# Patient Record
Sex: MALE | Race: WHITE | HISPANIC OR LATINO | Employment: FULL TIME | URBAN - METROPOLITAN AREA
[De-identification: names, ages, dates, MRNs, and addresses within clinical notes are randomized per-mention and may not be internally consistent; named-entity substitution may affect disease eponyms.]

---

## 2022-09-25 ENCOUNTER — HOSPITAL ENCOUNTER (EMERGENCY)
Facility: CLINIC | Age: 42
Discharge: HOME | End: 2022-09-26
Attending: EMERGENCY MEDICINE | Admitting: EMERGENCY MEDICINE
Payer: COMMERCIAL

## 2022-09-25 DIAGNOSIS — R00.2 PALPITATIONS: ICD-10-CM

## 2022-09-25 DIAGNOSIS — E87.6 HYPOKALEMIA: ICD-10-CM

## 2022-09-25 LAB
BASOPHILS # BLD AUTO: 0.2 10E3/UL (ref 0–0.2)
BASOPHILS NFR BLD AUTO: 1 %
EOSINOPHIL # BLD AUTO: 0.4 10E3/UL (ref 0–0.7)
EOSINOPHIL NFR BLD AUTO: 3 %
ERYTHROCYTE [DISTWIDTH] IN BLOOD BY AUTOMATED COUNT: 12 % (ref 10–15)
HCT VFR BLD AUTO: 45.2 % (ref 40–53)
HGB BLD-MCNC: 16.2 G/DL (ref 13.3–17.7)
IMM GRANULOCYTES # BLD: 0.1 10E3/UL
IMM GRANULOCYTES NFR BLD: 0 %
LACTATE SERPL-SCNC: 1.8 MMOL/L (ref 0.7–2)
LYMPHOCYTES # BLD AUTO: 3.1 10E3/UL (ref 0.8–5.3)
LYMPHOCYTES NFR BLD AUTO: 19 %
MCH RBC QN AUTO: 30.3 PG (ref 26.5–33)
MCHC RBC AUTO-ENTMCNC: 35.8 G/DL (ref 31.5–36.5)
MCV RBC AUTO: 85 FL (ref 78–100)
MONOCYTES # BLD AUTO: 0.9 10E3/UL (ref 0–1.3)
MONOCYTES NFR BLD AUTO: 6 %
NEUTROPHILS # BLD AUTO: 11.7 10E3/UL (ref 1.6–8.3)
NEUTROPHILS NFR BLD AUTO: 71 %
NRBC # BLD AUTO: 0 10E3/UL
NRBC BLD AUTO-RTO: 0 /100
PLATELET # BLD AUTO: 296 10E3/UL (ref 150–450)
RBC # BLD AUTO: 5.35 10E6/UL (ref 4.4–5.9)
WBC # BLD AUTO: 16.4 10E3/UL (ref 4–11)

## 2022-09-25 PROCEDURE — 99284 EMERGENCY DEPT VISIT MOD MDM: CPT | Performed by: EMERGENCY MEDICINE

## 2022-09-25 PROCEDURE — 84443 ASSAY THYROID STIM HORMONE: CPT | Performed by: EMERGENCY MEDICINE

## 2022-09-25 PROCEDURE — 85025 COMPLETE CBC W/AUTO DIFF WBC: CPT | Performed by: EMERGENCY MEDICINE

## 2022-09-25 PROCEDURE — 84484 ASSAY OF TROPONIN QUANT: CPT | Performed by: EMERGENCY MEDICINE

## 2022-09-25 PROCEDURE — 83605 ASSAY OF LACTIC ACID: CPT | Performed by: EMERGENCY MEDICINE

## 2022-09-25 PROCEDURE — 80053 COMPREHEN METABOLIC PANEL: CPT | Performed by: EMERGENCY MEDICINE

## 2022-09-25 PROCEDURE — 36415 COLL VENOUS BLD VENIPUNCTURE: CPT | Performed by: EMERGENCY MEDICINE

## 2022-09-25 PROCEDURE — 83735 ASSAY OF MAGNESIUM: CPT | Performed by: EMERGENCY MEDICINE

## 2022-09-25 PROCEDURE — 99284 EMERGENCY DEPT VISIT MOD MDM: CPT

## 2022-09-25 RX ORDER — GABAPENTIN 100 MG/1
100 CAPSULE ORAL 2 TIMES DAILY
COMMUNITY

## 2022-09-25 RX ORDER — FUROSEMIDE 40 MG
40 TABLET ORAL DAILY
COMMUNITY

## 2022-09-25 RX ORDER — SPIRONOLACTONE 100 MG/1
100 TABLET, FILM COATED ORAL 2 TIMES DAILY
COMMUNITY

## 2022-09-25 RX ORDER — HYDROXYZINE HYDROCHLORIDE 25 MG/1
25 TABLET, FILM COATED ORAL 4 TIMES DAILY PRN
COMMUNITY

## 2022-09-25 ASSESSMENT — ACTIVITIES OF DAILY LIVING (ADL): ADLS_ACUITY_SCORE: 33

## 2022-09-26 VITALS
RESPIRATION RATE: 16 BRPM | WEIGHT: 215 LBS | SYSTOLIC BLOOD PRESSURE: 129 MMHG | TEMPERATURE: 98 F | OXYGEN SATURATION: 95 % | DIASTOLIC BLOOD PRESSURE: 85 MMHG | HEART RATE: 95 BPM

## 2022-09-26 LAB
ALBUMIN SERPL-MCNC: 3.9 G/DL (ref 3.4–5)
ALP SERPL-CCNC: 106 U/L (ref 40–150)
ALT SERPL W P-5'-P-CCNC: 31 U/L (ref 0–70)
ANION GAP SERPL CALCULATED.3IONS-SCNC: 7 MMOL/L (ref 3–14)
AST SERPL W P-5'-P-CCNC: 23 U/L (ref 0–45)
ATRIAL RATE - MUSE: 100 BPM
BILIRUB SERPL-MCNC: 0.3 MG/DL (ref 0.2–1.3)
BUN SERPL-MCNC: 15 MG/DL (ref 7–30)
CALCIUM SERPL-MCNC: 9.1 MG/DL (ref 8.5–10.1)
CHLORIDE BLD-SCNC: 100 MMOL/L (ref 94–109)
CO2 SERPL-SCNC: 29 MMOL/L (ref 20–32)
CREAT SERPL-MCNC: 1.21 MG/DL (ref 0.66–1.25)
DIASTOLIC BLOOD PRESSURE - MUSE: NORMAL MMHG
GFR SERPL CREATININE-BSD FRML MDRD: 78 ML/MIN/1.73M2
GLUCOSE BLD-MCNC: 115 MG/DL (ref 70–99)
HOLD SPECIMEN: NORMAL
HOLD SPECIMEN: NORMAL
INTERPRETATION ECG - MUSE: NORMAL
MAGNESIUM SERPL-MCNC: 1.9 MG/DL (ref 1.6–2.3)
MAGNESIUM SERPL-MCNC: 2 MG/DL (ref 1.6–2.3)
P AXIS - MUSE: 60 DEGREES
POTASSIUM BLD-SCNC: 3 MMOL/L (ref 3.4–5.3)
PR INTERVAL - MUSE: 142 MS
PROT SERPL-MCNC: 8.5 G/DL (ref 6.8–8.8)
QRS DURATION - MUSE: 102 MS
QT - MUSE: 344 MS
QTC - MUSE: 443 MS
R AXIS - MUSE: 80 DEGREES
SODIUM SERPL-SCNC: 136 MMOL/L (ref 133–144)
SYSTOLIC BLOOD PRESSURE - MUSE: NORMAL MMHG
T AXIS - MUSE: 48 DEGREES
TROPONIN I SERPL HS-MCNC: 4 NG/L
TROPONIN T BLD-MCNC: 0 UG/L
TSH SERPL DL<=0.005 MIU/L-ACNC: 1.52 MU/L (ref 0.4–4)
VENTRICULAR RATE- MUSE: 100 BPM

## 2022-09-26 PROCEDURE — 250N000013 HC RX MED GY IP 250 OP 250 PS 637: Performed by: EMERGENCY MEDICINE

## 2022-09-26 PROCEDURE — 84484 ASSAY OF TROPONIN QUANT: CPT

## 2022-09-26 RX ORDER — POTASSIUM CHLORIDE 1.5 G/1.58G
40 POWDER, FOR SOLUTION ORAL ONCE
Status: COMPLETED | OUTPATIENT
Start: 2022-09-26 | End: 2022-09-26

## 2022-09-26 RX ADMIN — POTASSIUM CHLORIDE 40 MEQ: 1.5 FOR SOLUTION ORAL at 00:41

## 2022-09-26 ASSESSMENT — ENCOUNTER SYMPTOMS
PALPITATIONS: 1
ABDOMINAL DISTENTION: 0
LIGHT-HEADEDNESS: 0
DIZZINESS: 0

## 2022-09-26 ASSESSMENT — ACTIVITIES OF DAILY LIVING (ADL): ADLS_ACUITY_SCORE: 35

## 2022-09-26 NOTE — RESULT ENCOUNTER NOTE
Per protocol meds refilled.  Last OV 11/30/18.  Scripts escribed.   Within normal reference range.

## 2022-09-26 NOTE — DISCHARGE INSTRUCTIONS
Please make an appointment to follow up with Your Primary Care Provider as soon as possible. Please ask them to recheck your potassium level, and also review your need for continuing the Lasix.

## 2022-09-26 NOTE — ED TRIAGE NOTES
Triage Assessment     Row Name 09/25/22 [id number]       Triage Assessment (Adult)    Airway WDL WDL       Respiratory WDL    Respiratory WDL WDL       Skin Circulation/Temperature WDL    Skin Circulation/Temperature WDL WDL       Cardiac WDL    Cardiac WDL X;all    Cardiac Rhythm other (see comments)  reports irregular heart beat

## 2022-09-26 NOTE — ED PROVIDER NOTES
"     [facility] EMERGENCY DEPARTMENT ([facility])  9/25/22    History     Chief Complaint   Patient presents with     Palpitations     Reports for the past 1.5 weeks has had palpitations, reports has had them before and has had work up, feels them when sitting, feels \"them flip-flop\", mild pain to upper back between shoulder blades     HPI  Robert Iglesias is a 42 year old male with PMH significant for alcoholic cirrhosis of the liver with ascites and alcohol dependence now in remission (last drink 4 years ago) who presents to the ED for evaluation of palpitations for the past week.  He states that he gets a sensation of his heart skipping/adding a beat intermittently throughout the day.  He states that he only notices these palpitations when he is at rest and denies noting any while he is exerting himself.  He denies chest pain, lightheadedness, or dizziness.    Patient reports he did have this previously approximately 7 years ago that did resolve on its own.  He was worked up at a outside hospital in [place] without specific diagnosis.  Patient does have a history of ascites and is currently on spironolactone and Lasix.  He reports he has not had a drink in 4 years and has not had any fluid buildup.  Patient reports that he does smoke and he has been doing so since his early 20s.  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    Past Medical History  No past medical history on file.  No past surgical history on file.  furosemide (LASIX) 40 MG tablet  gabapentin (NEURONTIN) 100 MG capsule  hydrOXYzine (ATARAX) 25 MG tablet  spironolactone (ALDACTONE) 100 MG tablet      No Known Allergies  Family History  No family history on file.  Social History       Past medical history, past surgical history, medications, allergies, family history, and social history were reviewed with the patient. No additional pertinent items.       Review of Systems   Cardiovascular: Positive for palpitations. Negative for chest pain " and leg swelling.   Gastrointestinal: Negative for abdominal distention.   Neurological: Negative for dizziness and light-headedness.   All other systems reviewed and are negative.    A complete review of systems was performed with pertinent positives and negatives noted in the HPI, and all other systems negative.    Physical Exam   BP: (!) 144/90  Pulse: 115  Temp: 98.6  F (37  C)  Resp: 16  Weight: 97.5 kg (215 lb)  SpO2: 99 %  Physical Exam  Vitals and nursing note reviewed.   Constitutional:       General: He is not in acute distress.     Appearance: He is not diaphoretic.   HENT:      Head: Atraumatic.   Eyes:      General: No scleral icterus.     Pupils: Pupils are equal, round, and reactive to light.   Cardiovascular:      Rate and Rhythm: Normal rate and regular rhythm.      Heart sounds: Normal heart sounds.   Pulmonary:      Effort: No respiratory distress.      Breath sounds: Normal breath sounds.   Abdominal:      General: Bowel sounds are normal.      Palpations: Abdomen is soft.      Tenderness: There is no abdominal tenderness.   Musculoskeletal:         General: No tenderness.   Skin:     General: Skin is warm.      Findings: No rash.   Neurological:      General: No focal deficit present.      Mental Status: He is oriented to person, place, and time.           ED Course      Procedures   12:07 AM  The patient was seen and examined by Alexys Barcenas MD in Room ED18.                   Results for orders placed or performed during the hospital encounter of 09/25/22   Comprehensive metabolic panel     Status: Abnormal   Result Value Ref Range    Sodium 136 133 - 144 mmol/L    Potassium 3.0 (L) 3.4 - 5.3 mmol/L    Chloride 100 94 - 109 mmol/L    Carbon Dioxide (CO2) 29 20 - 32 mmol/L    Anion Gap 7 3 - 14 mmol/L    Urea Nitrogen 15 7 - 30 mg/dL    Creatinine 1.21 0.66 - 1.25 mg/dL    Calcium 9.1 8.5 - 10.1 mg/dL    Glucose 115 (H) 70 - 99 mg/dL    Alkaline Phosphatase 106 40 - 150 U/L    AST 23 0 - 45  U/L    ALT 31 0 - 70 U/L    Protein Total 8.5 6.8 - 8.8 g/dL    Albumin 3.9 3.4 - 5.0 g/dL    Bilirubin Total 0.3 0.2 - 1.3 mg/dL    GFR Estimate 78 >60 mL/min/1.73m2   Magnesium     Status: Normal   Result Value Ref Range    Magnesium 2.0 1.6 - 2.3 mg/dL   TSH with free T4 reflex     Status: Normal   Result Value Ref Range    TSH 1.52 0.40 - 4.00 mU/L   CBC with platelets and differential     Status: Abnormal   Result Value Ref Range    WBC Count 16.4 (H) 4.0 - 11.0 10e3/uL    RBC Count 5.35 4.40 - 5.90 10e6/uL    Hemoglobin 16.2 13.3 - 17.7 g/dL    Hematocrit 45.2 40.0 - 53.0 %    MCV 85 78 - 100 fL    MCH 30.3 26.5 - 33.0 pg    MCHC 35.8 31.5 - 36.5 g/dL    RDW 12.0 10.0 - 15.0 %    Platelet Count 296 150 - 450 10e3/uL    % Neutrophils 71 %    % Lymphocytes 19 %    % Monocytes 6 %    % Eosinophils 3 %    % Basophils 1 %    % Immature Granulocytes 0 %    NRBCs per 100 WBC 0 <1 /100    Absolute Neutrophils 11.7 (H) 1.6 - 8.3 10e3/uL    Absolute Lymphocytes 3.1 0.8 - 5.3 10e3/uL    Absolute Monocytes 0.9 0.0 - 1.3 10e3/uL    Absolute Eosinophils 0.4 0.0 - 0.7 10e3/uL    Absolute Basophils 0.2 0.0 - 0.2 10e3/uL    Absolute Immature Granulocytes 0.1 <=0.4 10e3/uL    Absolute NRBCs 0.0 10e3/uL   [place] Draw     Status: None    Narrative    The following orders were created for panel order [place] Draw.  Procedure                               Abnormality         Status                     ---------                               -----------         ------                     Extra Blue Top Tube[[id number]]                              Final result               Extra Red Top Tube[[id number]]                               Final result                 Please view results for these tests on the individual orders.   Extra Blue Top Tube     Status: None   Result Value Ref Range    Hold Specimen JIC    Extra Red Top Tube     Status: None   Result Value Ref Range    Hold Specimen JIC    Lactic acid whole blood STAT     Status:  Normal   Result Value Ref Range    Lactic Acid 1.8 0.7 - 2.0 mmol/L   EKG 12 lead     Status: None (Preliminary result)   Result Value Ref Range    Systolic Blood Pressure  mmHg    Diastolic Blood Pressure  mmHg    Ventricular Rate 100 BPM    Atrial Rate 100 BPM    IN Interval 142 ms    QRS Duration 102 ms     ms    QTc 443 ms    P Axis 60 degrees    R AXIS 80 degrees    T Axis 48 degrees    Interpretation ECG Sinus rhythm  Normal ECG      CBC with platelets differential     Status: Abnormal    Narrative    The following orders were created for panel order CBC with platelets differential.  Procedure                               Abnormality         Status                     ---------                               -----------         ------                     CBC with platelets and d...[[id number]]  Abnormal            Final result                 Please view results for these tests on the individual orders.     Medications - No data to display     Assessments & Plan (with Medical Decision Making)     42 year old male with PMH significant for alcoholic cirrhosis of the liver with ascites and alcohol dependence now in remission (last drink 4 years ago) who presents to the ED for evaluation of palpitations for the past week.  Vital signs in triage notable for elevated heart rate 115 elevated blood pressure 144/90 but otherwise afebrile and stable including normal pulse ox at 99% on room air.  IV established, labs drawn sent reviewed negative epic remarkable for potassium 3.0 and a mild leukocytosis with white count of 16.4 otherwise normal CBC electrolytes.  EKG obtained interpreted by me which revealed no acute process.  Patient is given potassium 40 mg p.o. and upon repeat assessment patient symptoms had completely normalized.  Patient will be discharged home with plan to follow-up with primary care provider for further evaluation and care.  He expressed verbal understanding of anticipatory guidance return  precautions to the emergency department.    I have reviewed the nursing notes. I have reviewed the findings, diagnosis, plan and need for follow up with the patient.    New Prescriptions    No medications on file       Final diagnoses:   Palpitations   Hypokalemia     ILopez, am serving as a trained medical scribe to document services personally performed by Alexys Barcenas MD, based on the provider's statements to me.     IAlexys MD, was physically present and have reviewed and verified the accuracy of this note documented by Lopez Reyes.    --  Alexys Barcenas MD  [facility] EMERGENCY DEPARTMENT  9/25/2022     Alexys Barcenas MD  10/01/22 [id number]